# Patient Record
Sex: MALE | Race: BLACK OR AFRICAN AMERICAN | Employment: UNEMPLOYED | URBAN - METROPOLITAN AREA
[De-identification: names, ages, dates, MRNs, and addresses within clinical notes are randomized per-mention and may not be internally consistent; named-entity substitution may affect disease eponyms.]

---

## 2021-11-18 ENCOUNTER — HOSPITAL ENCOUNTER (EMERGENCY)
Age: 26
Discharge: HOME | End: 2021-11-19
Attending: EMERGENCY MEDICINE
Payer: COMMERCIAL

## 2021-11-18 DIAGNOSIS — M79.604 BILATERAL LEG PAIN: ICD-10-CM

## 2021-11-18 DIAGNOSIS — M79.605 BILATERAL LEG PAIN: ICD-10-CM

## 2021-11-18 DIAGNOSIS — R03.0 ELEVATED BLOOD PRESSURE READING: ICD-10-CM

## 2021-11-18 DIAGNOSIS — R74.8 ELEVATED CK: Primary | ICD-10-CM

## 2021-11-18 LAB
A/G RATIO: 1.4 (ref 1.1–2.2)
ALBUMIN SERPL-MCNC: 4.9 G/DL (ref 3.4–5)
ALP BLD-CCNC: 63 U/L (ref 40–129)
ALT SERPL-CCNC: 33 U/L (ref 10–40)
ANION GAP SERPL CALCULATED.3IONS-SCNC: 11 MMOL/L (ref 3–16)
AST SERPL-CCNC: 34 U/L (ref 15–37)
BASOPHILS ABSOLUTE: 0.1 K/UL (ref 0–0.2)
BASOPHILS RELATIVE PERCENT: 0.9 %
BILIRUB SERPL-MCNC: 0.5 MG/DL (ref 0–1)
BILIRUBIN URINE: NEGATIVE
BLOOD, URINE: NEGATIVE
BUN BLDV-MCNC: 10 MG/DL (ref 7–20)
CALCIUM SERPL-MCNC: 9.6 MG/DL (ref 8.3–10.6)
CHLORIDE BLD-SCNC: 99 MMOL/L (ref 99–110)
CLARITY: CLEAR
CO2: 28 MMOL/L (ref 21–32)
COLOR: YELLOW
CREAT SERPL-MCNC: 1 MG/DL (ref 0.9–1.3)
EOSINOPHILS ABSOLUTE: 0.1 K/UL (ref 0–0.6)
EOSINOPHILS RELATIVE PERCENT: 2.1 %
GFR AFRICAN AMERICAN: >60
GFR NON-AFRICAN AMERICAN: >60
GLUCOSE BLD-MCNC: 86 MG/DL (ref 70–99)
GLUCOSE URINE: NEGATIVE MG/DL
HCT VFR BLD CALC: 45 % (ref 40.5–52.5)
HEMOGLOBIN: 14.9 G/DL (ref 13.5–17.5)
KETONES, URINE: NEGATIVE MG/DL
LACTIC ACID: 1.2 MMOL/L (ref 0.4–2)
LEUKOCYTE ESTERASE, URINE: NEGATIVE
LYMPHOCYTES ABSOLUTE: 2.2 K/UL (ref 1–5.1)
LYMPHOCYTES RELATIVE PERCENT: 33.7 %
MCH RBC QN AUTO: 28.1 PG (ref 26–34)
MCHC RBC AUTO-ENTMCNC: 33.2 G/DL (ref 31–36)
MCV RBC AUTO: 84.7 FL (ref 80–100)
MICROSCOPIC EXAMINATION: NORMAL
MONOCYTES ABSOLUTE: 0.4 K/UL (ref 0–1.3)
MONOCYTES RELATIVE PERCENT: 6.7 %
NEUTROPHILS ABSOLUTE: 3.7 K/UL (ref 1.7–7.7)
NEUTROPHILS RELATIVE PERCENT: 56.6 %
NITRITE, URINE: NEGATIVE
PDW BLD-RTO: 12.7 % (ref 12.4–15.4)
PH UA: 7 (ref 5–8)
PLATELET # BLD: 277 K/UL (ref 135–450)
PMV BLD AUTO: 7.3 FL (ref 5–10.5)
POTASSIUM REFLEX MAGNESIUM: 3.6 MMOL/L (ref 3.5–5.1)
PROTEIN UA: NEGATIVE MG/DL
RBC # BLD: 5.31 M/UL (ref 4.2–5.9)
SODIUM BLD-SCNC: 138 MMOL/L (ref 136–145)
SPECIFIC GRAVITY UA: 1.01 (ref 1–1.03)
TOTAL CK: 946 U/L (ref 39–308)
TOTAL PROTEIN: 8.3 G/DL (ref 6.4–8.2)
URINE REFLEX TO CULTURE: NORMAL
URINE TYPE: NORMAL
UROBILINOGEN, URINE: 1 E.U./DL
WBC # BLD: 6.5 K/UL (ref 4–11)

## 2021-11-18 PROCEDURE — 80053 COMPREHEN METABOLIC PANEL: CPT

## 2021-11-18 PROCEDURE — 83605 ASSAY OF LACTIC ACID: CPT

## 2021-11-18 PROCEDURE — 2580000003 HC RX 258: Performed by: EMERGENCY MEDICINE

## 2021-11-18 PROCEDURE — 36415 COLL VENOUS BLD VENIPUNCTURE: CPT

## 2021-11-18 PROCEDURE — 99283 EMERGENCY DEPT VISIT LOW MDM: CPT

## 2021-11-18 PROCEDURE — 82550 ASSAY OF CK (CPK): CPT

## 2021-11-18 PROCEDURE — 6370000000 HC RX 637 (ALT 250 FOR IP): Performed by: NURSE PRACTITIONER

## 2021-11-18 PROCEDURE — 6360000002 HC RX W HCPCS: Performed by: NURSE PRACTITIONER

## 2021-11-18 PROCEDURE — 85025 COMPLETE CBC W/AUTO DIFF WBC: CPT

## 2021-11-18 PROCEDURE — 81003 URINALYSIS AUTO W/O SCOPE: CPT

## 2021-11-18 PROCEDURE — 96374 THER/PROPH/DIAG INJ IV PUSH: CPT

## 2021-11-18 RX ORDER — TRAMADOL HYDROCHLORIDE 50 MG/1
50 TABLET ORAL ONCE
Status: COMPLETED | OUTPATIENT
Start: 2021-11-18 | End: 2021-11-18

## 2021-11-18 RX ORDER — ONDANSETRON 2 MG/ML
4 INJECTION INTRAMUSCULAR; INTRAVENOUS ONCE
Status: COMPLETED | OUTPATIENT
Start: 2021-11-18 | End: 2021-11-18

## 2021-11-18 RX ORDER — 0.9 % SODIUM CHLORIDE 0.9 %
1000 INTRAVENOUS SOLUTION INTRAVENOUS ONCE
Status: COMPLETED | OUTPATIENT
Start: 2021-11-18 | End: 2021-11-18

## 2021-11-18 RX ADMIN — SODIUM CHLORIDE 1000 ML: 9 INJECTION, SOLUTION INTRAVENOUS at 22:09

## 2021-11-18 RX ADMIN — ONDANSETRON 4 MG: 2 INJECTION INTRAMUSCULAR; INTRAVENOUS at 22:34

## 2021-11-18 RX ADMIN — TRAMADOL HYDROCHLORIDE 50 MG: 50 TABLET, FILM COATED ORAL at 22:08

## 2021-11-18 ASSESSMENT — PAIN DESCRIPTION - PAIN TYPE: TYPE: ACUTE PAIN

## 2021-11-18 ASSESSMENT — PAIN DESCRIPTION - ONSET: ONSET: ON-GOING

## 2021-11-18 ASSESSMENT — PAIN DESCRIPTION - DESCRIPTORS: DESCRIPTORS: ACHING

## 2021-11-18 ASSESSMENT — PAIN DESCRIPTION - ORIENTATION: ORIENTATION: RIGHT;LEFT

## 2021-11-18 ASSESSMENT — PAIN SCALES - GENERAL
PAINLEVEL_OUTOF10: 10
PAINLEVEL_OUTOF10: 7

## 2021-11-18 ASSESSMENT — PAIN DESCRIPTION - FREQUENCY: FREQUENCY: CONTINUOUS

## 2021-11-18 ASSESSMENT — PAIN DESCRIPTION - LOCATION: LOCATION: LEG

## 2021-11-19 VITALS
WEIGHT: 286.16 LBS | HEART RATE: 65 BPM | TEMPERATURE: 98 F | BODY MASS INDEX: 32.24 KG/M2 | OXYGEN SATURATION: 100 % | SYSTOLIC BLOOD PRESSURE: 145 MMHG | DIASTOLIC BLOOD PRESSURE: 95 MMHG | RESPIRATION RATE: 18 BRPM

## 2021-11-19 LAB — TOTAL CK: 741 U/L (ref 39–308)

## 2021-11-19 RX ORDER — HYDROCODONE BITARTRATE AND ACETAMINOPHEN 5; 325 MG/1; MG/1
1 TABLET ORAL 2 TIMES DAILY PRN
Qty: 4 TABLET | Refills: 0 | Status: SHIPPED | OUTPATIENT
Start: 2021-11-19 | End: 2021-11-21

## 2021-11-19 RX ORDER — TRAMADOL HYDROCHLORIDE 50 MG/1
50 TABLET ORAL EVERY 6 HOURS PRN
Qty: 9 TABLET | Refills: 0 | Status: SHIPPED | OUTPATIENT
Start: 2021-11-19 | End: 2021-11-22

## 2021-11-19 ASSESSMENT — ENCOUNTER SYMPTOMS
SHORTNESS OF BREATH: 0
COUGH: 0
EYE PAIN: 0
BACK PAIN: 0
NAUSEA: 0
ANAL BLEEDING: 0
VOMITING: 0
SORE THROAT: 0
ABDOMINAL PAIN: 0

## 2021-11-19 NOTE — ED PROVIDER NOTES
[address] [facility]        Pt Name: Bianka Jiménez  MRN: [id number]  [place] 1995  Date of evaluation: 11/18/2021  Provider: PRASANTH Truong - ABDIRIZAK  PCP: Jay Bright. Christie Jones DO, DO  Note Started: 2:52 PM EST       I have seen and evaluated this patient with my supervising physician  Dysuria      Triage CHIEF COMPLAINT       Chief Complaint   Patient presents with    Leg Pain     Pt reports \"bilateral leg pain for a couple of months\". Pt is concerned that it is \"rhabdomyolysis\". Pt alert and oriented and rates pain as a 7/10 at this time. HISTORY OF PRESENT ILLNESS   (Location/Symptom, Timing/Onset, Context/Setting, Quality, Duration, Modifying Factors, Severity)  Note limiting factors. Chief Complaint: Leg pain    Bianka Jiménez is a 31 y.o. nontoxic, well-appearing, but distressed male who presents with bilateral leg pain. The pain started 3 months ago. Context was that the patient began working out extensively in order to lose weight and to get into better shape. He has always worked out but states 2 months ago he began swimming in addition to his other workouts including heavy weightlifting and running. The pain is sore and throbbing and rated as 10/10. He denies alleviating factors. The pain is exacerbated by weightbearing. Patient was seen at [facility] for the same and reports that he was told that nothing is wrong. He therefore presents here for evaluation. NKI. He is ambulatory. Nursing Notes were all reviewed and agreed with or any disagreements were addressed in the HPI. REVIEW OF SYSTEMS    (2-9 systems for level 4, 10 or more for level 5)     Review of Systems   Constitutional: Negative for chills, diaphoresis, fatigue and fever. HENT: Negative for congestion and sore throat. Eyes: Negative for pain and visual disturbance. Respiratory: Negative for cough and shortness of breath. Cardiovascular: Negative for chest pain and leg swelling. Gastrointestinal: Negative for abdominal pain, anal bleeding, nausea and vomiting. Genitourinary: Negative for difficulty urinating, dysuria, frequency and urgency. Musculoskeletal: Positive for arthralgias and myalgias. Negative for back pain and neck pain. Skin: Negative for rash and wound. Neurological: Negative for dizziness and light-headedness. PAST MEDICAL HISTORY   No past medical history on file. SURGICAL HISTORY   No past surgical history on file. [address]       Discharge Medication List as of 11/19/2021  1:24 AM      CONTINUE these medications which have NOT CHANGED    Details   mefloquine (LARIAM) 250 MG tablet TK 1 T PO Q 7 DAYS FOR 9 DOSES, R-0Historical Med             ALLERGIES     Patient has no known allergies. FAMILYHISTORY     No family history on file. SOCIAL HISTORY       Social History     Socioeconomic History    Marital status: Single     Spouse name: Not on file    Number of children: Not on file    Years of education: Not on file    Highest education level: Not on file   Occupational History    Not on file   Tobacco Use    Smoking status: Never Smoker    Smokeless tobacco: Never Used   Substance and Sexual Activity    Alcohol use: No    Drug use: No    Sexual activity: Yes   Other Topics Concern    Not on file   Social History Narrative    Not on file     Social Determinants of Health     Financial Resource Strain:     Difficulty of Paying Living Expenses: Not on file   Food Insecurity:     Worried About Running Out of Food in the Last Year: Not on file    Angelito of Food in the Last Year: Not on file   Transportation Needs:     Lack of Transportation (Medical): Not on file    Lack of Transportation (Non-Medical):  Not on file   Physical Activity:     Days of Exercise per Week: Not on file    Minutes of Exercise per Session: Not on file   Stress:     Feeling of Stress : Not on file   Social Connections:     Frequency of Communication with Friends and Family: Not on file    Frequency of Social Gatherings with Friends and Family: Not on file    Attends Sabianist Services: Not on file    Active Member of Clubs or Organizations: Not on file    Attends Club or Organization Meetings: Not on file    Marital Status: Not on file   Intimate Partner Violence:     Fear of Current or Ex-Partner: Not on file    Emotionally Abused: Not on file    Physically Abused: Not on file    Sexually Abused: Not on file   Housing Stability:     Unable to Pay for Housing in the Last Year: Not on file    Number of [place] in the Last Year: Not on file    Unstable Housing in the Last Year: Not on file       SCREENINGS             PHYSICAL EXAM    (up to 7 for level 4, 8 or more for level 5)     ED Triage Vitals [11/18/21 1745]   BP Temp Temp Source Pulse Resp SpO2 Height Weight   (!) 175/99 98.8 °F (37.1 °C) Temporal 83 18 99 % -- 286 lb 2.5 oz (129.8 kg)       Physical Exam  Vitals and nursing note reviewed. Constitutional:       Appearance: Normal appearance. He is not diaphoretic. HENT:      Head: Normocephalic and atraumatic. Right Ear: External ear normal.      Left Ear: External ear normal.      Nose: Nose normal.      Mouth/Throat:      Mouth: Mucous membranes are moist.   Eyes:      General:         Right eye: No discharge. Left eye: No discharge. Extraocular Movements: Extraocular movements intact. Pupils: Pupils are equal, round, and reactive to light. Cardiovascular:      Rate and Rhythm: Normal rate and regular rhythm. Pulses: Normal pulses. Heart sounds: Normal heart sounds. Pulmonary:      Effort: Pulmonary effort is normal. No respiratory distress. Abdominal:      General: Bowel sounds are normal.      Palpations: Abdomen is soft. Musculoskeletal:         General: Normal range of motion.       Cervical back: Normal range of motion and neck supple. Legs:    Skin:     General: Skin is warm and dry. Capillary Refill: Capillary refill takes less than 2 seconds. Findings: No bruising, ecchymosis, erythema, signs of injury or rash. Neurological:      Mental Status: He is alert and oriented to person, place, and time. Psychiatric:         Mood and Affect: Mood normal.         Behavior: Behavior normal.         DIAGNOSTIC RESULTS   LABS:    Labs Reviewed   COMPREHENSIVE METABOLIC PANEL W/ REFLEX TO MG FOR LOW K - Abnormal; Notable for the following components:       Result Value    Total Protein 8.3 (*)     All other components within normal limits    Narrative:     Performed at:  [facility]  [address], [employer] [facility] [employer]   Phone [phone]   CK - Abnormal; Notable for the following components: Total  (*)     All other components within normal limits    Narrative:     Performed at:  [facility]  [address], [facility] [employer] [facility]   Phone [phone]   CK - Abnormal; Notable for the following components: Total  (*)     All other components within normal limits    Narrative:     Performed at:  [facility]  [address], [place] [employer]   Phone [phone]   URINE RT REFLEX TO CULTURE    Narrative:     Performed at:  [facility]  [address], [facility] [employer] [facility]   Phone [phone]   CBC WITH AUTO DIFFERENTIAL    Narrative:     Performed at:  [facility]  [address], [place] [employer]   Phone [phone]   LACTIC ACID, PLASMA    Narrative:     Performed at:  [facility]  [address], [facility] [employer] [facility] [employer]   Phone [phone]       When ordered, only abnormal lab results are displayed.  All other labs were within normal range or not returned as of this dictation. EKG: When ordered, EKG's are interpreted by the Emergency Department Physician in the absence of a cardiologist.  Please see their note for interpretation of EKG. RADIOLOGY:   Non-plain film images such as CT, Ultrasound and MRI are read by the radiologist. Plain radiographic images are visualized andpreliminarily     interpreted by the  ED Provider with the below findings:        Interpretation perthe Radiologist below, if available at the time of this note:    No orders to display     No results found. PROCEDURES   Unless otherwise noted below, none     Procedures    CRITICAL CARE TIME   N/A    CONSULTS:  None      EMERGENCY DEPARTMENT COURSE and DIFFERENTIAL DIAGNOSIS/MDM:   Patient presents emerged department bilateral lower extremity pain. Considered alternate diagnoses but less likely based on history and physical.  Considered DVT, dependent peripheral edema, CHF, lymphedema, venous stasis, cellulitis, abscess, trauma, rhabdomyolysis, crush injury/injury, among others less likely based on history and physical.    Basic labs, lactic acid, and CK were obtained as well as a urine reflex to culture. CMP shows total protein minimally elevated 8.3 but otherwise unremarkable, lactic acid not elevation, urine unremarkable, CBC negative for leukocytosis or anemia. Patient's CK is 946. Patient will be hydrated and his CK reevaluated thereafter. If the CK is decreasing patient will be discharged home with outpatient follow-up with PCP or PRS as he is not happy with his present provider at this time. If the CK is elevating he will be admitted to the hospital for further evaluation and treatment. Repeat CPK result was obtained and after liter of normal saline there is a 2 cm by point decrease in his CK which is now 741. Patient endorses good improvement of his pain which is now 5/10.   Therefore, my attending physician I feel that the patient is both safe and appropriate for DISCONTINUED MEDICATIONS:  Discharge Medication List as of 11/19/2021  1:24 AM                 (Please note that portions ofthis note were completed with a voice recognition program.  Efforts were made to edit the dictations but occasionally words are mis-transcribed.)    PRASANTH De La Cruz CNP (electronically signed)             PRASANTH De La Cruz CNP  11/19/21 2025 [facility] PRASANTH Sheets CNP  11/19/21 3232

## 2021-11-19 NOTE — ED PROVIDER NOTES
Date of evaluation: 11/18/2021    ED Attending Attestation Note     CHIEF COMPLAINT     I've been having pain in my legs for the last three months, it's throbbing. HISTORY OF PRESENT ILLNESS  (Location/Symptom, Timing/Onset,Context/Setting, Quality, Duration, Modifying Factors, Severity). Note limiting factors. This patient was seen by the advance practice provider. I have seen and examined the patient, agree with the workup, evaluation, management and diagnosis. The care plan has been discussed. Chief Complaint   Patient presents with    Leg Pain     Pt reports \"bilateral leg pain for a couple of months\". Pt is concerned that it is \"rhabdomyolysis\". Pt alert and oriented and rates pain as a 7/10 at this time. Thomasena Hodgkins is a 31 y.o. male who presents to the emergency department secondary to concern for bilateral lower extremity pain. Has been seen and worked up previously at [address] [place] [address] where he states he was told nothing is wrong. No past medical history noted below, he denies any history. 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      Social History     Socioeconomic History    Marital status: Single     Spouse name: Not on file    Number of children: Not on file    Years of education: Not on file    Highest education level: Not on file   Occupational History    Not on file   Tobacco Use    Smoking status: Never Smoker    Smokeless tobacco: Never Used   Substance and Sexual Activity    Alcohol use: No    Drug use: No    Sexual activity: Yes   Other Topics Concern    Not on file   Social History Narrative    Not on file     Social Determinants of Health     Financial Resource Strain:     Difficulty of Paying Living Expenses: Not on file   Food Insecurity:     Worried About Running Out of Food in the Last Year: Not on file    Angelito of Food in the Last Year: Not on file   Transportation Needs:     Lack of Transportation (Medical): Not on file    Lack of Transportation (Non-Medical): Not on file   Physical Activity:     Days of Exercise per Week: Not on file    Minutes of Exercise per Session: Not on file   Stress:     Feeling of Stress : Not on file   Social Connections:     Frequency of Communication with Friends and Family: Not on file    Frequency of Social Gatherings with Friends and Family: Not on file    Attends Jew Services: Not on file    Active Member of [address] [place] [address] [employer] or Organizations: Not on file    Attends Club or Organization Meetings: Not on file    Marital Status: Not on file   Intimate Partner Violence:     Fear of Current or Ex-Partner: Not on file    Emotionally Abused: Not on file    Physically Abused: Not on file    Sexually Abused: Not on file   Housing Stability:     Unable to Pay for Housing in the Last Year: Not on file    Number of [place] in the Last Year: Not on file    Unstable Housing in the Last Year: Not on file     Aside from what is stated above denies any other symptoms or modifying factors. Nursing Notes reviewed. No past surgical history on file. No family history on file. CURRENT MEDICATIONS       Previous Medications    MEFLOQUINE (LARIAM) 250 MG TABLET    TK 1 T PO Q 7 DAYS FOR 9 DOSES      DIAGNOSTIC RESULTS     RADIOLOGY:   Interpretation per Radiologist below, if available at the time of this note:  No orders to display     Patient was given the following medications:  Orders Placed This Encounter   Medications    traMADol (ULTRAM) tablet 50 mg    0.9 % sodium chloride bolus    ondansetron (ZOFRAN) injection 4 mg    traMADol (ULTRAM) 50 MG tablet     Sig: Take 1 tablet by mouth every 6 hours as needed for Pain for up to 3 days. Intended supply: 3 days.  Take lowest dose possible to manage pain     Dispense:  9 tablet     Refill:  0    HYDROcodone-acetaminophen (NORCO) 5-325 MG per tablet     Sig: Take 1 tablet by mouth 2 times daily as needed for Pain for up to 2 days. Intended supply: 3 days. Take lowest dose possible to manage pain     Dispense:  4 tablet     Refill:  0       INITIAL VITALS: BP: (!) 175/99, Temp: 98.8 °F (37.1 °C), Pulse: 83, Resp: 18, SpO2: 99 %   RECENT VITALS:  BP: (!) 145/95,Temp: 98 °F (36.7 °C), Pulse: 65, Resp: 18, SpO2: 100 %     My assessment reveals an overall well-appearing black male. Mom was present at the bedside as well. Initial vitals notable for elevated blood pressure 175/99 otherwise hemodynamically stable and within normal limits. On physical exam his bilateral lower extremities are not tense. No signs of compartment syndrome. Discussed his findings today were consistent with an elevated CK level though not specifically for rhabdomyolysis as he was below the cutoff of 5 times over the normal limit/2000. His urine and renal panel are reassuring. No signs of ELI, no myoglobin or blood in the urine. Discussed importance of remaining well-hydrated and decreasing the strain of his activity as currently he tells me his activity is mimicking what he did in high school at which time he states he could squat 6- 800 pounds. Discussed weight loss had more to do with p.o. intake versus hard workouts. Repeat vitals with improved blood pressure. Repeat CK improved after IV fluids and since he does not currently have a primary care though is interested in having one he was given a referral.  Discussed return precautions, reiterated importance of changing his workout routine, and he was discharged home in stable condition. FINAL IMPRESSION      1. Elevated CK    2. Bilateral leg pain    3.  Elevated blood pressure reading        DISPOSITION/PLAN   DISPOSITION        PATIENT REFERRED TO:  [facility]) Pre-Services  [phone]  Call in 1 day      [facility] Emergency Department  [address] [address] [phone]  Go to   As needed      DISCHARGE MEDICATIONS:  New Prescriptions HYDROCODONE-ACETAMINOPHEN (NORCO) 5-325 MG PER TABLET    Take 1 tablet by mouth 2 times daily as needed for Pain for up to 2 days. Intended supply: 3 days. Take lowest dose possible to manage pain    TRAMADOL (ULTRAM) 50 MG TABLET    Take 1 tablet by mouth every 6 hours as needed for Pain for up to 3 days. Intended supply: 3 days.  Take lowest dose possible to manage pain            (Please note that portions of this note were completed with a voice recognition program. Efforts were made to edit the dictations but occasionally words are mis-transcribed.)    Chuck Baum MD (electronically signed)  Attending Emergency Physician        Chuck Baum MD  11/19/21 4674

## 2021-11-19 NOTE — ED NOTES
Pt discharged back home, reviewed discharged instructions with pt follow up care , pain control and return precautions discussed , pt verbalized understanding.  Pt ambulated out of the department with steady gait          Sandy Kaminski RN  11/19/21 0147

## 2021-12-18 ENCOUNTER — HOSPITAL ENCOUNTER (EMERGENCY)
Age: 26
Discharge: HOME | End: 2021-12-18
Payer: COMMERCIAL

## 2021-12-18 VITALS
WEIGHT: 282.2 LBS | TEMPERATURE: 98.5 F | SYSTOLIC BLOOD PRESSURE: 148 MMHG | BODY MASS INDEX: 32.65 KG/M2 | HEART RATE: 84 BPM | HEIGHT: 78 IN | DIASTOLIC BLOOD PRESSURE: 102 MMHG | OXYGEN SATURATION: 100 %

## 2021-12-18 DIAGNOSIS — R03.0 ELEVATED BLOOD PRESSURE READING: ICD-10-CM

## 2021-12-18 DIAGNOSIS — M79.604 BILATERAL LEG PAIN: Primary | ICD-10-CM

## 2021-12-18 DIAGNOSIS — M79.605 BILATERAL LEG PAIN: Primary | ICD-10-CM

## 2021-12-18 PROCEDURE — 6370000000 HC RX 637 (ALT 250 FOR IP): Performed by: PHYSICIAN ASSISTANT

## 2021-12-18 PROCEDURE — 99283 EMERGENCY DEPT VISIT LOW MDM: CPT

## 2021-12-18 RX ORDER — IBUPROFEN 800 MG/1
800 TABLET ORAL ONCE
Status: COMPLETED | OUTPATIENT
Start: 2021-12-18 | End: 2021-12-18

## 2021-12-18 RX ADMIN — IBUPROFEN 800 MG: 800 TABLET, FILM COATED ORAL at 13:18

## 2021-12-18 ASSESSMENT — PAIN DESCRIPTION - LOCATION: LOCATION: LEG

## 2021-12-18 ASSESSMENT — PAIN DESCRIPTION - ORIENTATION: ORIENTATION: LEFT;RIGHT

## 2021-12-18 ASSESSMENT — PAIN SCALES - GENERAL
PAINLEVEL_OUTOF10: 10
PAINLEVEL_OUTOF10: 7

## 2021-12-18 NOTE — ED NOTES
AVS reviewed with patient. Verbalized understanding. AVS was printed and given to patient.        Zechariah Mcintosh RN  12/18/21 0170

## 2021-12-18 NOTE — ED PROVIDER NOTES
[address] [facility] ENCOUNTER        Pt Name: Kiko Guerra  MRN: [id number]  [place] 1995  Date of evaluation: 12/18/2021  Provider: LOYDA Bermudez      ADVANCED PRACTICE PROVIDER, I HAVE EVALUATED THIS PATIENT    CHIEF COMPLAINT     Leg pain      HISTORY OF PRESENT ILLNESS  (Location/Symptom, Timing/Onset, Context/Setting, Quality, Duration,Modifying Factors, Severity.)   Christopher Vazquez is a 31 y.o. male who presents to the emergencydepartment for bilateral leg pain located all throughout his legs. This has been going on since April since renovating the house. Tells me he \"wants answers\". He is a difficult historian as he just wants to sleep in the stretcher. He reports he is tired because he was up all night because he locked himself out of his residence so sat outside all night. I had reviewed his chart prior to evaluating him and had seen that he been following with PCP for nontraumatic rhabdomyolysis and had been referred to Neuro who he saw 2 days ago. Patient does not know the results of that visit. I told him he needed to follow-up with his PCP regarding this. He is asking if his PCP is here today and if he can see him. Patient denies any recent increased activity or any injuries to the lesg. Denies fevers or chills. Nursing Notes were reviewed and I agree. REVIEW OF SYSTEMS    (2-9 systems for level 4, 10 or more for level 5)   Review of Systems     Pertinent positives and negatives as per HPI. PAST MEDICAL HISTORY         Diagnosis Date    Hypertension        SURGICAL HISTORY   History reviewed. No pertinent surgical history. CURRENT MEDICATIONS       Previous Medications    MEFLOQUINE (LARIAM) 250 MG TABLET    TK 1 T PO Q 7 DAYS FOR 9 DOSES       ALLERGIES     Patient has no known allergies. FAMILY HISTORY     History reviewed. No pertinent family history. No family status information on file.         SOCIAL HISTORY reports that he has been smoking cigars. He has never used smokeless tobacco. He reports current alcohol use. He reports current drug use. Drug: Marijuana Don Garcia). PHYSICAL EXAM    (up to 7 for level 4, 8 or more for level 5)     ED Triage Vitals [12/18/21 1217]   BP Temp Temp Source Pulse Resp SpO2 Height Weight   (!) 148/102 98.5 °F (36.9 °C) Oral 84 -- 100 % 6' 7\" (2.007 m) 282 lb 3.2 oz (128 kg)       Physical Exam  Constitutional:       General: He is not in acute distress. Appearance: Normal appearance. He is well-developed. He is not ill-appearing, toxic-appearing or diaphoretic. HENT:      Head: Normocephalic and atraumatic. Pulmonary:      Effort: Pulmonary effort is normal. No respiratory distress. Musculoskeletal:         General: Normal range of motion. Cervical back: Normal range of motion and neck supple. Comments: No TTP entire legs bilaterally with no erythema edema or ecchymosis  Both legs have soft compartments  Both legs have FROM  DP pulse 2+ bilaterally   Skin:     General: Skin is warm. Neurological:      Mental Status: He is alert. Psychiatric:         Mood and Affect: Mood normal.         Behavior: Behavior normal.         Thought Content: Thought content normal.         Judgment: Judgment normal.         DIFFERENTIAL DIAGNOSIS   Rhabdomyolysis, compartment syndrome, musculoskeletal pain, other    DIAGNOSTICRESULTS         RADIOLOGY:   Non-plain film images such as CT, Ultrasound and MRI are read by the radiologist. Plain radiographic images are visualized and preliminarily interpreted by LOYDA Mishra with the below findings:      Interpretation per the Radiologist below, if available at the time of this note:    No orders to display         LABS:  Labs Reviewed - No data to display    All other labs were within normal range or not returned as of this dictation.     EMERGENCY DEPARTMENT COURSE and DIFFERENTIALDIAGNOSIS/MDM:   Vitals:    Vitals:    12/18/21 1217   BP: (!) 148/102   Pulse: 84   Temp: 98.5 °F (36.9 °C)   TempSrc: Oral   SpO2: 100%   Weight: 282 lb 3.2 oz (128 kg)   Height: 6' 7\" (2.007 m)       Patient wasnontoxic, well appearing, afebrile with normal vital signs with exception of hypertension 148/102. His legs are neurovascularly intact with full range of motion. They are nontender. They have soft compartments. I have a low suspicion for compartment syndrome or vascular compromise. I reviewed ED visit at [address] [place] [address] 12/16/21 where he presented for possible exposure to lead from April until December and back pain from a fall years ago. Urinalysis negative. UDS + THC. Magnesium was normal.  Hepatic function panel normal. Ethanol 11. CK was WNL. BMP was  WNL. CBC WNL. X-ray lumbar spine was negative for acute findings. Also saw his PCP for nontraumatic rhabdomyolysis and manic episode 12/10/21. Given names of psychiatrists to FU with. Noted to have been to  Psych 11/24/21 for inpatient admission for manic episode with new dx of bipolar. Also noted to have recurrent bilateral leg pain brought on by fasting and exercise. Hx rhabdomyolysis. PCP considering metabolic myopathy causing the recurrent rhabo and checking targeted genetic analysis and referred to Neuromuscular disease for evaluation. I can see that patient did have OV with neuro 12/16/21 but cannot see provider note yet. He denies any injuries to the legs recently so I do not believe imaging indicated. Denies any increased activity and just had a normal CK 2 days ago so I do not believe repeat is indicated today. Discussed with patient he needed to follow-up with primary care doctor. As I am trying to discuss follow up with him, he says \"just put on my AVS\". Asked to call his mom so I gave him the phone. PROCEDURES:  None    FINAL IMPRESSION      1. Bilateral leg pain    2.  Elevated blood pressure reading        DISPOSITION/PLAN   DISPOSITION Decision To Discharge 12/18/2021 01:08:03 PM      PATIENT REFERRED TO:  Krupa Ricketts.  [address], [address] [place] [address] [facility] [address]  [phone]    Schedule an appointment as soon as possible for a visit in 2 days  for reevaluation and to recheck your blood pressure    [address] [facility]   [place] [address] [phone]    As needed, If symptoms worsen      DISCHARGE MEDICATIONS:  New Prescriptions    No medications on file       (Please note that portions ofthis note were completed with a voice recognition program.  Efforts were made to edit the dictations but occasionally words are mis-transcribed.)    Eva Teran, [address] [place] [address]  12/18/21 1319

## 2022-01-26 ENCOUNTER — HOSPITAL ENCOUNTER (EMERGENCY)
Age: 27
Discharge: HOME | End: 2022-01-26
Attending: EMERGENCY MEDICINE
Payer: COMMERCIAL

## 2022-01-26 VITALS
DIASTOLIC BLOOD PRESSURE: 94 MMHG | RESPIRATION RATE: 20 BRPM | SYSTOLIC BLOOD PRESSURE: 151 MMHG | TEMPERATURE: 97.7 F | WEIGHT: 291.01 LBS | BODY MASS INDEX: 33.67 KG/M2 | OXYGEN SATURATION: 99 % | HEIGHT: 78 IN | HEART RATE: 82 BPM

## 2022-01-26 DIAGNOSIS — F12.950 CANNABIS-INDUCED PSYCHOTIC DISORDER WITH DELUSIONS (HCC): Primary | ICD-10-CM

## 2022-01-26 PROCEDURE — 99282 EMERGENCY DEPT VISIT SF MDM: CPT

## 2022-01-26 RX ORDER — RISPERIDONE 1 MG/1
1 TABLET, FILM COATED ORAL 2 TIMES DAILY
Qty: 60 TABLET | Refills: 3 | Status: SHIPPED | OUTPATIENT
Start: 2022-01-26

## 2022-01-26 RX ORDER — RISPERIDONE 0.5 MG/1
0.5 TABLET, ORALLY DISINTEGRATING ORAL ONCE
Status: DISCONTINUED | OUTPATIENT
Start: 2022-01-26 | End: 2022-01-26 | Stop reason: RX

## 2022-01-26 ASSESSMENT — ENCOUNTER SYMPTOMS
DIARRHEA: 0
ABDOMINAL PAIN: 0
SHORTNESS OF BREATH: 0
NAUSEA: 0
CHEST TIGHTNESS: 0
COUGH: 0
SORE THROAT: 0
VOMITING: 0

## 2022-01-26 ASSESSMENT — PAIN SCALES - GENERAL: PAINLEVEL_OUTOF10: 3

## 2022-01-26 ASSESSMENT — PAIN DESCRIPTION - PAIN TYPE: TYPE: ACUTE PAIN

## 2022-01-26 ASSESSMENT — PAIN DESCRIPTION - DESCRIPTORS: DESCRIPTORS: ACHING

## 2022-01-26 ASSESSMENT — PAIN DESCRIPTION - ORIENTATION: ORIENTATION: LEFT;RIGHT

## 2022-01-26 ASSESSMENT — PAIN DESCRIPTION - LOCATION: LOCATION: LEG

## 2022-01-26 NOTE — ED PROVIDER NOTES
[id number] [facility]  EMERGENCY DEPARTMENTENCOUNTER      Pt Name: Fatmata Robert  MRN: [id number]  [place] 1995  Date ofevaluation: 2022  Provider: David Flores MD    CHIEF COMPLAINT       Chief Complaint   Patient presents with    Other     mom brought pt to ER states pt was smoking pot yesterday  has been paranoid since  happens when he smokes pot         HISTORY OF PRESENT ILLNESS   (Location/Symptom, Timing/Onset,Context/Setting, Quality, Duration, Modifying Factors, Severity)  Note limiting factors. Fatmata Robert is a 31 y.o. male  who  has a past medical history of Hypertension. 30-year-old male who presents with paranoia after he smoked marijuana yesterday. Patient states that this is the third time this is happened. Arrived with his mother. Patient is supposed to be on risperidone but he has not been taking it. Patient states that every times that he smokes weed he tends to get paranoid he does admit to drinking as well last night. Patient called his mother this morning crying because he thought that she had  which she obviously had not. Patient has a very slow affect however when his mother leaves the room he answers questions and states that he knows he is not supposed to be drinking and smoking but every time he does it he tends to get paranoid and he does follow with a psychiatrist but he has not been taking his risperidone as he is out of the medication. States last time this happened he had to go to [facility] but is never been admitted for psychiatric cause. Denies hearing voices no SI no HI. Nothing else seems to make his symptoms better. No other modifying factors. Risk factors his prior history of paranoia and psychosis after marijuana use. Symptoms came on suddenly and have been persistent but slightly improving at this time. NursingNotes were reviewed.     REVIEW OF SYSTEMS    (2-9 systems for level 4, 10 or more for level 5)     Review of Systems   Constitutional: Negative for chills, fatigue and fever. HENT: Negative for congestion and sore throat. Respiratory: Negative for cough, chest tightness and shortness of breath. Cardiovascular: Negative for chest pain. Gastrointestinal: Negative for abdominal pain, diarrhea, nausea and vomiting. Genitourinary: Negative. Musculoskeletal: Negative for myalgias. Skin: Negative. Neurological: Negative for dizziness, light-headedness and headaches. Psychiatric/Behavioral: Positive for agitation and behavioral problems. All other systems reviewed and are negative. Except as noted above the remainder of the review of systems was reviewed and negative. PAST MEDICAL HISTORY     Past Medical History:   Diagnosis Date    Hypertension          SURGICALHISTORY     No past surgical history on file. CURRENT MEDICATIONS       Previous Medications    MEFLOQUINE (LARIAM) 250 MG TABLET    TK 1 T PO Q 7 DAYS FOR 9 DOSES            Patient has no known allergies. FAMILY HISTORY     No family history on file.        SOCIAL HISTORY       Social History     Socioeconomic History    Marital status: Single     Spouse name: Not on file    Number of children: Not on file    Years of education: Not on file    Highest education level: Not on file   Occupational History    Not on file   Tobacco Use    Smoking status: Current Every Day Smoker     Types: Cigars    Smokeless tobacco: Current User   Substance and Sexual Activity    Alcohol use: Yes     Comment: occ    Drug use: Yes     Types: Marijuana (Weed)     Comment: last time yesterday    Sexual activity: Yes   Other Topics Concern    Not on file   Social History Narrative    Not on file     Social Determinants of Health     Financial Resource Strain:     Difficulty of Paying Living Expenses: Not on file   Food Insecurity:     Worried About Running Out of Food in the Last Year: Not on file    Angelito Rhythm: Normal rate and regular rhythm. Pulses: Normal pulses. Heart sounds: Normal heart sounds. Pulmonary:      Effort: Pulmonary effort is normal. No respiratory distress. Breath sounds: Normal breath sounds. No stridor. No decreased breath sounds, wheezing, rhonchi or rales. Chest:      Chest wall: No tenderness. Abdominal:      General: Bowel sounds are normal.      Palpations: Abdomen is soft. Tenderness: There is no abdominal tenderness. Musculoskeletal:         General: Normal range of motion. Cervical back: Normal range of motion and neck supple. No rigidity. Right lower leg: No edema. Left lower leg: No edema. Lymphadenopathy:      Cervical: No cervical adenopathy. Skin:     General: Skin is warm and dry. Capillary Refill: Capillary refill takes less than 2 seconds. Findings: No rash. Neurological:      General: No focal deficit present. Mental Status: He is alert and oriented to person, place, and time. GCS: GCS eye subscore is 4. GCS verbal subscore is 5. GCS motor subscore is 6. Cranial Nerves: Cranial nerves are intact. No cranial nerve deficit, dysarthria or facial asymmetry. Sensory: Sensation is intact. No sensory deficit. Motor: Motor function is intact. No weakness, tremor, atrophy, abnormal muscle tone, seizure activity or pronator drift. Coordination: Coordination is intact. Coordination normal. Finger-Nose-Finger Test and [facility] Test normal.   Psychiatric:         Attention and Perception: He is attentive. He does not perceive auditory or visual hallucinations. Mood and Affect: Affect is flat. Speech: Speech is not delayed. Behavior: Behavior normal. Behavior is not agitated or aggressive. Thought Content: Thought content is paranoid. Thought content is not delusional. Thought content does not include homicidal or suicidal ideation.  Thought content does not include homicidal or suicidal plan. Cognition and Memory: Cognition and memory normal.         Judgment: Judgment normal.         RESULTS     RADIOLOGY:   Non-plain filmimages such as CT, Ultrasound and MRI are read by the radiologist.     Interpretation per the Radiologist below, if available at the time ofthis note:    No orders to display         ED BEDSIDE ULTRASOUND:   Performed by ED Physician - none    LABS:  Labs Reviewed - No data to display    All other labs were within normal range or not returned as of this dictation. EMERGENCY DEPARTMENT COURSE and DIFFERENTIAL DIAGNOSIS/MDM:   Vitals:    Vitals:    01/26/22 0805   BP: (!) 151/94   Pulse: 82   Resp: 20   Temp: 97.7 °F (36.5 °C)   TempSrc: Oral   SpO2: 99%   Weight: 291 lb 0.1 oz (132 kg)   Height: 6' 7\" (2.007 m)       Patient was given thefollowing medications:  Medications - No data to display    ED COURSE & MEDICAL DECISION MAKING    Pertinent Labs & Imaging studies reviewed. (See chart for details)   -  Patient seen and evaluated in the emergency department. -  Triage and nursing notes reviewed and incorporated. -  Old chart records reviewed and incorporated. -  Differential diagnosis includes: Differential diagnoses: Drug or alcohol use or abuse, psychosis, behavioral mental illness, metabolic disturbance, infection, neurologic emergency, other    54-year-old male who presents with likely psychosis type episode and paranoia after marijuana use. This is the third time this is happened to him. Patient states this happens every time he smokes. Patient is afebrile not tachycardic saturating well on room air. Although patient is slightly paranoid in his affect is flat he does communicate with me normally when he asked his mother to leave the room. He states that he has been drinking and smoking recently and that has been making his mood worse. He does not want to harm himself or anyone else. He does feel paranoid at times.   He denies any auditory or visual hallucinations. He is fully alert and oriented. He asked that we give him a refill of his risperidone he states he will follow up with psychiatry. There is no other signs of metabolic disturbance, infection, neurologic emergency is neurologically intact. He does not appear harm to himself or others at this time. I will give him prescription for risperidone as well as discharged home a short return precautions for any new or worsening symptoms.      -  Work-up included:  See above  -  ED treatment included: See above  -  Results discussed with patient. REASSESSMENT      The patient is at low risk for mortality based on demographic, history and clinical factors. Given the best available information and clinical assessment, I estimate the risk of hospitalization to be greater than risk of treatment at home. I have explained to the patient that the risk could rapidly change, given precautions for return and instructions. Explained to patient that the risk for mortality is low based on demographic, history and clinical factors. I discussed with patient the results of evaluation in the ED, diagnosis, care, and prognosis. The plan is to discharge to home. Patient is in agreement with plan and questions have been answered. I also discussed with patient the reasons which may require a return visit and the importance of follow-up care. The patient is well-appearing, nontoxic, and improved at the time of discharge. Patient agrees to call to arrange follow-up care as directed. Patient understands to return immediately for worsening/change in symptoms. CRITICAL CARE TIME   Total Critical Care time was 15 minutes, excluding separately reportable procedures. There was a high probability of clinically significant/life threatening deterioration in the patient's condition which required my urgent intervention.       This includes multiple reevaluations, vital sign monitoring, pulse oximetry monitoring, telemetry monitoring, clinical response to the IV medications, reviewing the nursing notes, consultation time, dictation/documentation time, and interpretation of the labwork. (This time excludes time spent performing procedures). CONSULTS:  None    PROCEDURES:  Unless otherwise noted below, none     Procedures    FINAL IMPRESSION      1. Cannabis-induced psychotic disorder with delusions ([facility] [address]          DISPOSITION/PLAN   DISPOSITION        PATIENT REFERREDTO:  Jaguar Vila, [address] [facility] [address]  [phone]    Schedule an appointment as soon as possible for a visit         DISCHARGEMEDICATIONS:  New Prescriptions    RISPERIDONE (RISPERDAL) 1 MG TABLET    Take 1 tablet by mouth 2 times daily          (Please note that portions of this note were completed with a voice recognition program.  Efforts were made to edit the dictations but occasionally words are mis-transcribed.)    Callum Jack MD (electronically signed)  Attending Emergency Physician         Callum Jack MD  01/26/22 5914

## 2022-08-10 ENCOUNTER — HOSPITAL ENCOUNTER (EMERGENCY)
Age: 27
Discharge: HOME | End: 2022-08-10
Attending: EMERGENCY MEDICINE

## 2022-08-10 VITALS
HEIGHT: 78 IN | OXYGEN SATURATION: 95 % | TEMPERATURE: 97.5 F | BODY MASS INDEX: 35.1 KG/M2 | SYSTOLIC BLOOD PRESSURE: 143 MMHG | RESPIRATION RATE: 18 BRPM | DIASTOLIC BLOOD PRESSURE: 85 MMHG | HEART RATE: 67 BPM | WEIGHT: 303.35 LBS

## 2022-08-10 DIAGNOSIS — M79.671 RIGHT FOOT PAIN: Primary | ICD-10-CM

## 2022-08-10 PROCEDURE — 6370000000 HC RX 637 (ALT 250 FOR IP): Performed by: EMERGENCY MEDICINE

## 2022-08-10 PROCEDURE — 99283 EMERGENCY DEPT VISIT LOW MDM: CPT

## 2022-08-10 RX ORDER — NAPROXEN 250 MG/1
500 TABLET ORAL ONCE
Status: COMPLETED | OUTPATIENT
Start: 2022-08-10 | End: 2022-08-10

## 2022-08-10 RX ORDER — PREDNISONE 10 MG/1
60 TABLET ORAL DAILY
Qty: 30 TABLET | Refills: 0 | Status: SHIPPED | OUTPATIENT
Start: 2022-08-10 | End: 2022-08-15

## 2022-08-10 RX ORDER — NAPROXEN 500 MG/1
500 TABLET ORAL 2 TIMES DAILY WITH MEALS
Qty: 60 TABLET | Refills: 5 | Status: SHIPPED | OUTPATIENT
Start: 2022-08-10

## 2022-08-10 RX ORDER — PREDNISONE 20 MG/1
60 TABLET ORAL ONCE
Status: COMPLETED | OUTPATIENT
Start: 2022-08-10 | End: 2022-08-10

## 2022-08-10 RX ADMIN — PREDNISONE 60 MG: 20 TABLET ORAL at 02:59

## 2022-08-10 RX ADMIN — NAPROXEN 500 MG: 250 TABLET ORAL at 02:59

## 2022-08-10 ASSESSMENT — PAIN SCALES - GENERAL: PAINLEVEL_OUTOF10: 10

## 2022-08-10 ASSESSMENT — PAIN DESCRIPTION - LOCATION: LOCATION: FOOT

## 2022-08-10 ASSESSMENT — PAIN - FUNCTIONAL ASSESSMENT: PAIN_FUNCTIONAL_ASSESSMENT: 0-10

## 2022-08-10 ASSESSMENT — PAIN DESCRIPTION - ORIENTATION: ORIENTATION: LEFT

## 2022-08-11 ASSESSMENT — ENCOUNTER SYMPTOMS: COLOR CHANGE: 0

## 2022-08-11 NOTE — ED PROVIDER NOTES
[address] [facility]  [facility]      Pt Name: Clare Harrington  MRN: [id number]  [place] 1995  Date ofevaluation: 8/10/2022  Provider: Dee Andersen MD    CHIEF COMPLAINT       Chief Complaint   Patient presents with    Foot Pain     Left foot, patient states \"I just woke up and it was hurting\"          HISTORY OF PRESENT ILLNESS   (Location/Symptom, Timing/Onset,Context/Setting, Quality, Duration, Modifying Factors, Severity)  Note limiting factors. Clare Harrington is a 28 y.o. male  who  has a past medical history of Hypertension. who presents to the emergency department for evaluation of pain in of his right foot. Patient reports having intermittent pains over the past few weeks. States that he woke up and had pain in his foot. Denies injury or trauma. Patient is able to ambulate. No skin color changes. Denies ankle pain or knee pain. He did not take medications for symptoms. HPI    NursingNotes were reviewed. REVIEW OF SYSTEMS    (2-9 systems for level 4, 10 or more for level 5)     Review of Systems   Musculoskeletal:  Positive for myalgias. Skin:  Negative for color change and wound. Neurological:  Negative for weakness and numbness. Except as noted above the remainder of the review of systems was reviewed and negative. PAST MEDICAL HISTORY     Past Medical History:   Diagnosis Date    Hypertension          SURGICALHISTORY     History reviewed. No pertinent surgical history. CURRENT MEDICATIONS       Discharge Medication List as of 8/10/2022  3:00 AM        CONTINUE these medications which have NOT CHANGED    Details   risperiDONE (RISPERDAL) 1 MG tablet Take 1 tablet by mouth 2 times daily, Disp-60 tablet, R-3Print      mefloquine (LARIAM) 250 MG tablet TK 1 T PO Q 7 DAYS FOR 9 DOSES, R-0Historical Med                  Patient has no known allergies. FAMILY HISTORY     History reviewed. No pertinent family history.        SOCIAL HISTORY       Social History     Socioeconomic History    Marital status: Single     Spouse name: None    Number of children: None    Years of education: None    Highest education level: None   Tobacco Use    Smoking status: Every Day     Types: Cigars    Smokeless tobacco: Current   Substance and Sexual Activity    Alcohol use: Yes     Comment: occ    Drug use: Yes     Types: Marijuana (Weed)     Comment: last time yesterday    Sexual activity: Yes       SCREENINGS    Crescent Coma Scale  Eye Opening: Spontaneous  Best Verbal Response: Oriented  Best Motor Response: Obeys commands  Darrel Coma Scale Score: 15        PHYSICAL EXAM    (up to 7 for level 4, 8 or more for level 5)     ED Triage Vitals [08/10/22 0247]   BP Temp Temp Source Heart Rate Resp SpO2 Height Weight   (!) 143/85 97.5 °F (36.4 °C) Oral 67 18 95 % 6' 7\" (2.007 m) (!) 303 lb 5.7 oz (137.6 kg)       Physical Exam  Vitals reviewed. Constitutional:       General: He is not in acute distress. Appearance: He is well-developed. HENT:      Head: Normocephalic and atraumatic. Eyes:      Conjunctiva/sclera: Conjunctivae normal.   Neck:      Trachea: No tracheal deviation. Cardiovascular:      Rate and Rhythm: Normal rate and regular rhythm. Pulmonary:      Effort: Pulmonary effort is normal.      Breath sounds: Normal breath sounds. No wheezing or rales. Abdominal:      General: There is no distension. Palpations: Abdomen is soft. Tenderness: There is no abdominal tenderness. Musculoskeletal:         General: Tenderness present. No swelling, deformity or signs of injury. Normal range of motion. Cervical back: Normal range of motion. Skin:     General: Skin is warm and dry. Capillary Refill: Capillary refill takes less than 2 seconds. Coloration: Skin is not jaundiced. Findings: No bruising or erythema. Neurological:      Mental Status: He is alert and oriented to person, place, and time.        RESULTS EKG: All EKG's are interpreted by the Emergency Department Physician who either signs or Co-signsthis chart in the absence of a cardiologist.        RADIOLOGY:   [facility] such as CT, Ultrasound and MRI are read by the radiologist. Plain radiographic images are visualized and preliminarily interpreted by the emergency physician with the below findings:        Interpretation per the Radiologist below, if available at the time ofthis note:    No orders to display         ED BEDSIDE ULTRASOUND:   Performed by ED Physician - none    LABS:  Labs Reviewed - No data to display    All other labs were within normal range or not returned as of this dictation. EMERGENCY DEPARTMENT COURSE and DIFFERENTIAL DIAGNOSIS/MDM:   Vitals:    Vitals:    08/10/22 0247   BP: (!) 143/85   Pulse: 67   Resp: 18   Temp: 97.5 °F (36.4 °C)   TempSrc: Oral   SpO2: 95%   Weight: (!) 303 lb 5.7 oz (137.6 kg)   Height: 6' 7\" (2.007 m)       Patient was given thefollowing medications:  Medications   predniSONE (DELTASONE) tablet 60 mg (60 mg Oral Given 8/10/22 0259)   naproxen (NAPROSYN) tablet 500 mg (500 mg Oral Given 8/10/22 0259)       ED COURSE & MEDICAL DECISION MAKING    Pertinent Labs & Imaging studies reviewed. (See chart for details)   -  Patient seen and evaluated in the emergency department. -  Triage and nursing notes reviewed and incorporated. -  Old chart records reviewed and incorporated. -  Differential diagnosis includes: Differential diagnosis: includes but not limited to Arterial Injury/Ischemia, Fracture, Dislocation, Infection, Compartment Syndrome, Neurologic Deficit/Injury. -  Work-up included:  See above  -  ED treatment included: See above  -  Results discussed with patient. Patient presents for atraumatic pain to the foot. On exam no obvious deformities or signs of injury. No erythema or warmth. Patient does have mild tenderness. Patient be provided with analgesics.   Low suspicion for osseous abnormalities or infection. Patient feels improved on reevaluation. Symptomatic treatment with expectant management discussed with the patient and they and/or family members present are amenable to treatment plan and outpatient follow-up. Strict return precautions were discussed with the patient and those present. They demonstrated understanding of when to return to the emergency department for new or worsening symptoms. .  The patient is agreeable with plan of care and disposition. REASSESSMENT          CRITICAL CARE TIME   Total Critical Care time was 0 minutes, excluding separately reportable procedures. There was a high probability of clinically significant/life threatening deterioration in the patient's condition which required my urgent intervention. CONSULTS:  None    PROCEDURES:  Unless otherwise noted below, none     Procedures    FINAL IMPRESSION      1. Right foot pain          DISPOSITION/PLAN   DISPOSITION Decision To Discharge 08/10/2022 02:56:46 AM      PATIENT REFERREDTO:  Nelly Haq. Raphael Trejo, [address] [place], [place] [id number]  [phone]    Schedule an appointment as soon as possible for a visit   As needed      DISCHARGEMEDICATIONS:  Discharge Medication List as of 8/10/2022  3:00 AM        START taking these medications    Details   naproxen (NAPROSYN) 500 MG tablet Take 1 tablet by mouth in the morning and 1 tablet in the evening. Take with meals. , Disp-60 tablet, R-5Normal      predniSONE (DELTASONE) 10 MG tablet Take 6 tablets by mouth in the morning for 5 doses. , Disp-30 tablet, R-0Normal                (Please note that portions of this note were completed with a voice recognition program.  Efforts were made to edit the dictations but occasionally words are mis-transcribed.)    Jane Montes De Oca MD (electronically signed)  Attending Emergency Physician          Jane Montes De Oca MD  08/11/22 7439